# Patient Record
Sex: MALE | Race: WHITE | NOT HISPANIC OR LATINO | Employment: UNEMPLOYED | URBAN - METROPOLITAN AREA
[De-identification: names, ages, dates, MRNs, and addresses within clinical notes are randomized per-mention and may not be internally consistent; named-entity substitution may affect disease eponyms.]

---

## 2017-12-27 ENCOUNTER — OFFICE VISIT - HEALTHEAST (OUTPATIENT)
Dept: INTERNAL MEDICINE | Facility: CLINIC | Age: 37
End: 2017-12-27

## 2017-12-27 ENCOUNTER — COMMUNICATION - HEALTHEAST (OUTPATIENT)
Dept: TELEHEALTH | Facility: CLINIC | Age: 37
End: 2017-12-27

## 2017-12-27 DIAGNOSIS — F10.20 ALCOHOL DEPENDENCE (H): ICD-10-CM

## 2017-12-27 DIAGNOSIS — F41.1 GENERALIZED ANXIETY DISORDER: ICD-10-CM

## 2017-12-27 DIAGNOSIS — F17.200 TOBACCO DEPENDENCE: ICD-10-CM

## 2017-12-27 DIAGNOSIS — F19.20 POLYSUBSTANCE DEPENDENCE (H): ICD-10-CM

## 2017-12-27 DIAGNOSIS — N18.9 CHRONIC KIDNEY DISEASE: ICD-10-CM

## 2017-12-27 DIAGNOSIS — I10 HYPERTENSION: ICD-10-CM

## 2017-12-27 DIAGNOSIS — R80.9 PROTEINURIA: ICD-10-CM

## 2017-12-27 DIAGNOSIS — F32.9 MAJOR DEPRESSION: ICD-10-CM

## 2017-12-27 ASSESSMENT — MIFFLIN-ST. JEOR: SCORE: 1778.33

## 2018-01-16 ENCOUNTER — COMMUNICATION - HEALTHEAST (OUTPATIENT)
Dept: INTERNAL MEDICINE | Facility: CLINIC | Age: 38
End: 2018-01-16

## 2018-06-12 ENCOUNTER — COMMUNICATION - HEALTHEAST (OUTPATIENT)
Dept: INTERNAL MEDICINE | Facility: CLINIC | Age: 38
End: 2018-06-12

## 2018-06-14 ENCOUNTER — COMMUNICATION - HEALTHEAST (OUTPATIENT)
Dept: INTERNAL MEDICINE | Facility: CLINIC | Age: 38
End: 2018-06-14

## 2018-06-14 ENCOUNTER — AMBULATORY - HEALTHEAST (OUTPATIENT)
Dept: INTERNAL MEDICINE | Facility: CLINIC | Age: 38
End: 2018-06-14

## 2018-06-14 DIAGNOSIS — I10 HTN (HYPERTENSION): ICD-10-CM

## 2018-11-12 ENCOUNTER — COMMUNICATION - HEALTHEAST (OUTPATIENT)
Dept: INTERNAL MEDICINE | Facility: CLINIC | Age: 38
End: 2018-11-12

## 2018-11-12 DIAGNOSIS — I10 HYPERTENSION, UNSPECIFIED TYPE: ICD-10-CM

## 2021-05-31 VITALS — WEIGHT: 176 LBS | BODY MASS INDEX: 22.59 KG/M2 | HEIGHT: 74 IN

## 2021-06-15 NOTE — PROGRESS NOTES
ASSESSMENT:      1. Alcohol dependence  Current sobriety for 7 months, ongoing outpatient treatment    2. Polysubstance dependence  Remote, stimulant - possibly the cause of chronic kidney disease with proteinuria.  Continue lisinopril, get records of kidney disease and treatment.  (had kidney biopsy 1996- [place]), currently asymptomatic. He says that kidney disease has not been progressive.     3. Hypertension  Under control with current lisinopril with prazosin.      4. Chronic kidney disease  See above, obtain outside records    5. Proteinuria  History of, getting records    6. Tobacco dependence  Ongoing for now, cutting down, not ready for cessation.     7. Major depression  On wellbutrin, has ongoing psychiatric care.     8. Generalized anxiety disorder  On fluoxetine, seems controlled, has ongoing psychiatric care.     Will refill chronic medications, follow up 4 months, sooner prn, outside records are requested.     Medications Discontinued During This Encounter   Medication Reason     prazosin (MINIPRESS) 1 MG capsule Reorder     lisinopril (PRINIVIL,ZESTRIL) 10 MG tablet Reorder     hydrOXYzine (ATARAX) 50 MG tablet Reorder     FLUoxetine (PROZAC) 20 MG capsule Reorder     buPROPion (WELLBUTRIN XL) 150 MG 24 hr tablet Reorder       CHIEF COMPLAINT:  Chief Complaint   Patient presents with     Establish Care     Fatigue     Feels easy tired and weak     Medication Management       HISTORY OF PRESENT ILLNESS:  Joaquin Cote is a 37 y.o. male here for establishing care.  He is in outpatient phase of prolonged alcohol dependence treatment, sober  Months.  Past history of stimulant and polysubstance abuse as well.  History of major depression and anxiety disorder.  He feels all are currently stable and improving.  Has ongoing psychiatric care.  No recent fever, or unusual cough, or nausea or dyspnea.     REVIEW OF SYSTEMS:   See HPI, all other systems are negative.    TOBACCO USE:  History   Smoking Status      "Current Every Day Smoker     Packs/day: 0.25   Smokeless Tobacco     Never Used       VITALS:  Vitals:    12/27/17 1109   BP: 104/80   Patient Site: Left Arm   Patient Position: Sitting   Cuff Size: Adult Regular   Pulse: 64   SpO2: 96%   Weight: 176 lb (79.8 kg)   Height: 6' 2.02\" (1.88 m)     Wt Readings from Last 3 Encounters:   12/27/17 176 lb (79.8 kg)     PHYSICAL EXAM:  Constitutional:  Alert, in NAD, oriented times 3  Neck:  Supple, thyroid not palpable. No adenopathy  Cardiac:  Regular rate and rhythm without murmur  Lungs: Clear.  No wheezes or rhonchi  Abdomen:   Bowel sounds positive, nontender, nondistended.      MEDICATIONS:  Current Outpatient Prescriptions   Medication Sig Dispense Refill     buPROPion (WELLBUTRIN XL) 150 MG 24 hr tablet Take 1 tablet (150 mg total) by mouth daily. 30 tablet 3     FLUoxetine (PROZAC) 20 MG capsule Take 1 capsule (20 mg total) by mouth daily. 30 capsule 3     hydrOXYzine (ATARAX) 50 MG tablet Take 1 tablet (50 mg total) by mouth 2 (two) times a day as needed for itching. 60 tablet 3     lisinopril (PRINIVIL,ZESTRIL) 10 MG tablet Take 1 tablet (10 mg total) by mouth daily. 30 tablet 3     prazosin (MINIPRESS) 1 MG capsule Take 1 capsule (1 mg total) by mouth at bedtime. 30 capsule 3     traZODone (DESYREL) 50 MG tablet Take 50 mg by mouth at bedtime.       No current facility-administered medications for this visit.      His dose for fluoxetine is 60 mg po daily, and minipres 3 mg po daily.  Orders changed and sent to the pharmacy.    "

## 2021-06-16 PROBLEM — F32.9 MAJOR DEPRESSION: Chronic | Status: ACTIVE | Noted: 2017-12-27

## 2021-06-16 PROBLEM — F41.1 GENERALIZED ANXIETY DISORDER: Chronic | Status: ACTIVE | Noted: 2017-12-27

## 2021-06-16 PROBLEM — I10 HYPERTENSION: Status: ACTIVE | Noted: 2017-12-27

## 2021-06-16 PROBLEM — N18.9 CHRONIC KIDNEY DISEASE: Status: ACTIVE | Noted: 2017-12-27

## 2021-06-16 PROBLEM — F10.20 ALCOHOL DEPENDENCE (H): Status: ACTIVE | Noted: 2017-12-27

## 2021-06-16 PROBLEM — R80.9 PROTEINURIA: Status: ACTIVE | Noted: 2017-12-27

## 2021-06-16 PROBLEM — F17.200 TOBACCO DEPENDENCE: Chronic | Status: ACTIVE | Noted: 2017-12-27

## 2021-06-16 PROBLEM — F19.20 POLYSUBSTANCE DEPENDENCE (H): Status: ACTIVE | Noted: 2017-12-27

## 2021-07-03 NOTE — ADDENDUM NOTE
Addendum Note by Torres Hodges MD at 1/17/2018 12:02 PM     Author: Torres Hodges MD Service: -- Author Type: Physician    Filed: 1/17/2018 12:02 PM Encounter Date: 12/27/2017 Status: Signed    : Torres Hodges MD (Physician)    Addended by: TORRES HODGES on: 1/17/2018 12:02 PM        Modules accepted: Orders